# Patient Record
Sex: FEMALE | Race: BLACK OR AFRICAN AMERICAN | URBAN - METROPOLITAN AREA
[De-identification: names, ages, dates, MRNs, and addresses within clinical notes are randomized per-mention and may not be internally consistent; named-entity substitution may affect disease eponyms.]

---

## 2021-06-09 ENCOUNTER — LAB OUTSIDE AN ENCOUNTER (OUTPATIENT)
Dept: URBAN - METROPOLITAN AREA CLINIC 105 | Facility: CLINIC | Age: 66
End: 2021-06-09

## 2021-06-09 ENCOUNTER — OFFICE VISIT (OUTPATIENT)
Dept: URBAN - METROPOLITAN AREA CLINIC 105 | Facility: CLINIC | Age: 66
End: 2021-06-09
Payer: COMMERCIAL

## 2021-06-09 VITALS
TEMPERATURE: 97.3 F | BODY MASS INDEX: 32.74 KG/M2 | SYSTOLIC BLOOD PRESSURE: 140 MMHG | DIASTOLIC BLOOD PRESSURE: 84 MMHG | HEIGHT: 64 IN | WEIGHT: 191.8 LBS | HEART RATE: 68 BPM

## 2021-06-09 DIAGNOSIS — K59.00 CONSTIPATION, UNSPECIFIED CONSTIPATION TYPE: ICD-10-CM

## 2021-06-09 DIAGNOSIS — Z12.11 COLON CANCER SCREENING: ICD-10-CM

## 2021-06-09 PROCEDURE — 99204 OFFICE O/P NEW MOD 45 MIN: CPT | Performed by: INTERNAL MEDICINE

## 2021-06-09 RX ORDER — PANTOPRAZOLE SODIUM 20 MG/1
1 TABLET TABLET, DELAYED RELEASE ORAL ONCE A DAY
Status: ACTIVE | COMMUNITY

## 2021-06-09 RX ORDER — VALSARTAN 40 MG/1
1 TABLET TABLET, FILM COATED ORAL TWICE A DAY
Status: ACTIVE | COMMUNITY

## 2021-06-09 RX ORDER — AZELASTINE HYDROCHLORIDE 137 UG/1
1 PUFF IN EACH NOSTRIL SPRAY, METERED NASAL TWICE A DAY
Status: ACTIVE | COMMUNITY

## 2021-06-09 RX ORDER — ROSUVASTATIN CALCIUM 5 MG/1
1 TABLET TABLET, FILM COATED ORAL ONCE A DAY
Status: ACTIVE | COMMUNITY

## 2021-06-09 RX ORDER — LEVOCETIRIZINE DIHYDROCHLORIDE 5 MG/1
1 TABLET IN THE EVENING TABLET, FILM COATED ORAL ONCE A DAY
Status: ACTIVE | COMMUNITY

## 2021-06-09 RX ORDER — DICLOFENAC SODIUM TOPICAL GEL, 1% 10 MG/G
AS DIRECTED GEL TOPICAL
Status: ACTIVE | COMMUNITY

## 2021-06-09 RX ORDER — ERGOCALCIFEROL 1.25 MG/1
1 CAPSULE CAPSULE ORAL
Status: ACTIVE | COMMUNITY

## 2021-06-09 RX ORDER — CITALOPRAM HYDROBROMIDE 40 MG/1
0.5 TABLET TABLET, FILM COATED ORAL ONCE A DAY
Status: ACTIVE | COMMUNITY

## 2021-06-09 RX ORDER — B-COMPLEX WITH VITAMIN C
AS DIRECTED TABLET ORAL
Status: ACTIVE | COMMUNITY

## 2021-06-09 RX ORDER — RAMELTEON 8 MG/1
1 TABLET AT BEDTIME AS NEEDED TABLET ORAL ONCE A DAY
Status: ACTIVE | COMMUNITY

## 2021-06-09 NOTE — HPI-TODAY'S VISIT:
The patient presents for constipation.  Today, the patient says she has been on Linzess 72 mcg 1 pill QD for the past 1-2 years but she is dissatisfied with this treatment. She has d/c at times because it causes diarrhea. Off Linzess, she has 1 BM/week. She previously has taken Ex-lax, but not Miralax. She feels bloated. Milk upsets her stomach she states. Her last colon was almost 10 years ago which reportedly noted DD.

## 2021-06-13 PROBLEM — 14760008: Status: ACTIVE | Noted: 2021-06-09

## 2021-06-28 ENCOUNTER — OFFICE VISIT (OUTPATIENT)
Dept: URBAN - METROPOLITAN AREA SURGERY CENTER 16 | Facility: SURGERY CENTER | Age: 66
End: 2021-06-28
Payer: COMMERCIAL

## 2021-06-28 DIAGNOSIS — Z12.11 COLON CANCER SCREENING: ICD-10-CM

## 2021-06-28 PROCEDURE — G8907 PT DOC NO EVENTS ON DISCHARG: HCPCS | Performed by: INTERNAL MEDICINE

## 2021-06-28 PROCEDURE — G0121 COLON CA SCRN NOT HI RSK IND: HCPCS | Performed by: INTERNAL MEDICINE

## 2022-01-26 ENCOUNTER — OFFICE VISIT (OUTPATIENT)
Dept: URBAN - METROPOLITAN AREA CLINIC 105 | Facility: CLINIC | Age: 67
End: 2022-01-26

## 2022-02-16 ENCOUNTER — OFFICE VISIT (OUTPATIENT)
Dept: URBAN - METROPOLITAN AREA CLINIC 105 | Facility: CLINIC | Age: 67
End: 2022-02-16
Payer: COMMERCIAL

## 2022-02-16 VITALS
HEIGHT: 64 IN | SYSTOLIC BLOOD PRESSURE: 161 MMHG | HEART RATE: 61 BPM | WEIGHT: 181.4 LBS | DIASTOLIC BLOOD PRESSURE: 86 MMHG | BODY MASS INDEX: 30.97 KG/M2 | TEMPERATURE: 96.8 F

## 2022-02-16 DIAGNOSIS — R12 HEARTBURN: ICD-10-CM

## 2022-02-16 DIAGNOSIS — R63.0 LOSS OF APPETITE: ICD-10-CM

## 2022-02-16 PROBLEM — 79890006: Status: ACTIVE | Noted: 2022-02-16

## 2022-02-16 PROCEDURE — 99213 OFFICE O/P EST LOW 20 MIN: CPT | Performed by: INTERNAL MEDICINE

## 2022-02-16 RX ORDER — CITALOPRAM HYDROBROMIDE 40 MG/1
0.5 TABLET TABLET, FILM COATED ORAL ONCE A DAY
Status: ACTIVE | COMMUNITY

## 2022-02-16 RX ORDER — ROSUVASTATIN CALCIUM 5 MG/1
1 TABLET TABLET, FILM COATED ORAL ONCE A DAY
Status: ACTIVE | COMMUNITY

## 2022-02-16 RX ORDER — DICLOFENAC SODIUM TOPICAL GEL, 1% 10 MG/G
AS DIRECTED GEL TOPICAL
Status: ACTIVE | COMMUNITY

## 2022-02-16 RX ORDER — B-COMPLEX WITH VITAMIN C
AS DIRECTED TABLET ORAL
Status: ACTIVE | COMMUNITY

## 2022-02-16 RX ORDER — RAMELTEON 8 MG/1
1 TABLET AT BEDTIME AS NEEDED TABLET ORAL ONCE A DAY
Status: ACTIVE | COMMUNITY

## 2022-02-16 RX ORDER — ERGOCALCIFEROL 1.25 MG/1
1 CAPSULE CAPSULE ORAL
Status: ACTIVE | COMMUNITY

## 2022-02-16 RX ORDER — PANTOPRAZOLE SODIUM 20 MG/1
1 TABLET TABLET, DELAYED RELEASE ORAL ONCE A DAY
Status: ACTIVE | COMMUNITY

## 2022-02-16 RX ORDER — LEVOCETIRIZINE DIHYDROCHLORIDE 5 MG/1
1 TABLET IN THE EVENING TABLET, FILM COATED ORAL ONCE A DAY
Status: ACTIVE | COMMUNITY

## 2022-02-16 RX ORDER — VALSARTAN 40 MG/1
1 TABLET TABLET, FILM COATED ORAL TWICE A DAY
Status: ACTIVE | COMMUNITY

## 2022-02-16 RX ORDER — AZELASTINE HYDROCHLORIDE 137 UG/1
1 PUFF IN EACH NOSTRIL SPRAY, METERED NASAL TWICE A DAY
Status: ACTIVE | COMMUNITY

## 2022-02-16 NOTE — HPI-TODAY'S VISIT:
The patient presents for constipation.  Today, the patient says she has been on Linzess 72 mcg 1 pill QD for the past 1-2 years but she is dissatisfied with this treatment. She has d/c at times because it causes diarrhea. Off Linzess, she has 1 BM/week. She previously has taken Ex-lax, but not Miralax. She feels bloated. Milk upsets her stomach she states. Her last colon was almost 10 years ago which reportedly noted DD. -   02/16/2022:  In   June of 2021 Dr. Thompson performed her colonoscopy that was normal aside from sigmoid diverticulosis and internal hemorrhoids. She requests to transfer care to me. Pt says that she got a bad case of food poisining from [employer]. had vomiting and diarrhea. was seen in urgent care and got IV fluids. - she is having some issues with acid reflux and some loss of appetite.

## 2022-02-25 ENCOUNTER — OFFICE VISIT (OUTPATIENT)
Dept: URBAN - METROPOLITAN AREA CLINIC 91 | Facility: CLINIC | Age: 67
End: 2022-02-25

## 2022-03-15 ENCOUNTER — OFFICE VISIT (OUTPATIENT)
Dept: URBAN - METROPOLITAN AREA CLINIC 91 | Facility: CLINIC | Age: 67
End: 2022-03-15

## 2022-04-06 NOTE — PHYSICAL EXAM CONSTITUTIONAL:
well developed, well nourished , in no acute distress , ambulating without difficulty , normal communication ability Dorsal Nasal Flap Text: The defect edges were debeveled with a #15 scalpel blade.  Given the location of the defect and the proximity to free margins a dorsal nasal flap was deemed most appropriate.  Using a sterile surgical marker, an appropriate dorsal nasal flap was drawn around the defect.    The area thus outlined was incised deep to adipose tissue with a #15 scalpel blade.  The skin margins were undermined to an appropriate distance in all directions utilizing iris scissors.

## 2022-04-11 ENCOUNTER — CLAIMS CREATED FROM THE CLAIM WINDOW (OUTPATIENT)
Dept: URBAN - METROPOLITAN AREA CLINIC 4 | Facility: CLINIC | Age: 67
End: 2022-04-11
Payer: COMMERCIAL

## 2022-04-11 ENCOUNTER — OFFICE VISIT (OUTPATIENT)
Dept: URBAN - METROPOLITAN AREA SURGERY CENTER 16 | Facility: SURGERY CENTER | Age: 67
End: 2022-04-11
Payer: COMMERCIAL

## 2022-04-11 DIAGNOSIS — K22.8 OTHER SPECIFIED DISEASES OF ESOPHAGUS: ICD-10-CM

## 2022-04-11 DIAGNOSIS — K31.89 FOCAL FOVEOLAR HYPERPLASIA: ICD-10-CM

## 2022-04-11 DIAGNOSIS — R10.13 ABDOMINAL DISCOMFORT, EPIGASTRIC: ICD-10-CM

## 2022-04-11 PROCEDURE — G8907 PT DOC NO EVENTS ON DISCHARG: HCPCS | Performed by: INTERNAL MEDICINE

## 2022-04-11 PROCEDURE — 88305 TISSUE EXAM BY PATHOLOGIST: CPT | Performed by: PATHOLOGY

## 2022-04-11 PROCEDURE — 43239 EGD BIOPSY SINGLE/MULTIPLE: CPT | Performed by: INTERNAL MEDICINE

## 2022-05-18 ENCOUNTER — OFFICE VISIT (OUTPATIENT)
Dept: URBAN - METROPOLITAN AREA CLINIC 105 | Facility: CLINIC | Age: 67
End: 2022-05-18

## 2022-05-23 ENCOUNTER — OFFICE VISIT (OUTPATIENT)
Dept: URBAN - METROPOLITAN AREA CLINIC 105 | Facility: CLINIC | Age: 67
End: 2022-05-23
Payer: COMMERCIAL

## 2022-05-23 DIAGNOSIS — R14.3 FLATULENCE: ICD-10-CM

## 2022-05-23 DIAGNOSIS — R12 BURNING REFLUX: ICD-10-CM

## 2022-05-23 DIAGNOSIS — R14.0 BLOATING: ICD-10-CM

## 2022-05-23 DIAGNOSIS — K44.9 HIATAL HERNIA: ICD-10-CM

## 2022-05-23 PROBLEM — 16331000: Status: ACTIVE | Noted: 2022-03-22

## 2022-05-23 PROCEDURE — 99214 OFFICE O/P EST MOD 30 MIN: CPT | Performed by: INTERNAL MEDICINE

## 2022-05-23 RX ORDER — DICLOFENAC SODIUM TOPICAL GEL, 1% 10 MG/G
AS DIRECTED GEL TOPICAL
Status: ON HOLD | COMMUNITY

## 2022-05-23 RX ORDER — AZELASTINE HYDROCHLORIDE 137 UG/1
1 PUFF IN EACH NOSTRIL SPRAY, METERED NASAL TWICE A DAY
Status: ACTIVE | COMMUNITY

## 2022-05-23 RX ORDER — LEVOCETIRIZINE DIHYDROCHLORIDE 5 MG/1
1 TABLET IN THE EVENING TABLET, FILM COATED ORAL ONCE A DAY
Status: ACTIVE | COMMUNITY

## 2022-05-23 RX ORDER — VALSARTAN 40 MG/1
1 TABLET TABLET, FILM COATED ORAL TWICE A DAY
Status: ACTIVE | COMMUNITY

## 2022-05-23 RX ORDER — RAMELTEON 8 MG/1
1 TABLET AT BEDTIME AS NEEDED TABLET ORAL ONCE A DAY
Status: ON HOLD | COMMUNITY

## 2022-05-23 RX ORDER — PANTOPRAZOLE SODIUM 20 MG/1
1 TABLET TABLET, DELAYED RELEASE ORAL ONCE A DAY
Status: ON HOLD | COMMUNITY

## 2022-05-23 RX ORDER — ROSUVASTATIN CALCIUM 5 MG/1
1 TABLET TABLET, FILM COATED ORAL ONCE A DAY
Status: ACTIVE | COMMUNITY

## 2022-05-23 RX ORDER — B-COMPLEX WITH VITAMIN C
AS DIRECTED TABLET ORAL
Status: ACTIVE | COMMUNITY

## 2022-05-23 RX ORDER — ERGOCALCIFEROL 1.25 MG/1
1 CAPSULE CAPSULE ORAL
Status: ACTIVE | COMMUNITY

## 2022-05-23 RX ORDER — CITALOPRAM HYDROBROMIDE 40 MG/1
0.5 TABLET TABLET, FILM COATED ORAL ONCE A DAY
Status: ACTIVE | COMMUNITY

## 2022-05-23 RX ORDER — OMEPRAZOLE 20 MG/1
CAPSULE, DELAYED RELEASE ORAL
Qty: 90 | Refills: 0 | Status: ACTIVE | COMMUNITY

## 2022-05-23 NOTE — HPI-TODAY'S VISIT:
The patient presents for constipation.  Today, the patient says she has been on Linzess 72 mcg 1 pill QD for the past 1-2 years but she is dissatisfied with this treatment. She has d/c at times because it causes diarrhea. Off Linzess, she has 1 BM/week. She previously has taken Ex-lax, but not Miralax. She feels bloated. Milk upsets her stomach she states. Her last colon was almost 10 years ago which reportedly noted DD. -   02/16/2022:  In   June of 2021 Dr. Thompson performed her colonoscopy that was normal aside from sigmoid diverticulosis and internal hemorrhoids. She requests to transfer care to me. Pt says that she got a bad case of food poisining from [employer]. had vomiting and diarrhea. was seen in urgent care and got IV fluids. - she is having some issues with acid reflux and some loss of appetite. -  05/23/2022: In April of 2022 we did EGD secondary to epigastric discomfort loss of appetite along with heartburn and acid reflux.  There was a fairly in medium size hiatal hernia present.  Antral biopsies were negative for Helicobacter.  Biopsies from the duodenum was negative for celiac disease.  In March of 2022 the patient underwent right upper quadrant ultrasound only showing hepatic steatosis. - she says that her stomach is bloated and "I have gas a lot". she is passing flatulence very often. her acid reflux is controlled.

## 2022-06-15 ENCOUNTER — TELEPHONE ENCOUNTER (OUTPATIENT)
Dept: URBAN - METROPOLITAN AREA CLINIC 95 | Facility: CLINIC | Age: 67
End: 2022-06-15

## 2022-06-16 ENCOUNTER — CLAIMS CREATED FROM THE CLAIM WINDOW (OUTPATIENT)
Dept: URBAN - METROPOLITAN AREA CLINIC 105 | Facility: CLINIC | Age: 67
End: 2022-06-16
Payer: COMMERCIAL

## 2022-06-16 ENCOUNTER — WEB ENCOUNTER (OUTPATIENT)
Dept: URBAN - METROPOLITAN AREA CLINIC 105 | Facility: CLINIC | Age: 67
End: 2022-06-16

## 2022-06-16 VITALS
HEIGHT: 64 IN | HEART RATE: 99 BPM | SYSTOLIC BLOOD PRESSURE: 158 MMHG | TEMPERATURE: 97.5 F | DIASTOLIC BLOOD PRESSURE: 96 MMHG | WEIGHT: 191.2 LBS | BODY MASS INDEX: 32.64 KG/M2

## 2022-06-16 DIAGNOSIS — I10 HTN (HYPERTENSION), BENIGN: ICD-10-CM

## 2022-06-16 DIAGNOSIS — R14.0 BLOATING: ICD-10-CM

## 2022-06-16 DIAGNOSIS — K57.90 DIVERTICULOSIS: ICD-10-CM

## 2022-06-16 DIAGNOSIS — E78.2 MIXED HYPERLIPIDEMIA: ICD-10-CM

## 2022-06-16 DIAGNOSIS — K21.9 GERD WITHOUT ESOPHAGITIS: ICD-10-CM

## 2022-06-16 DIAGNOSIS — K44.9 HIATAL HERNIA: ICD-10-CM

## 2022-06-16 DIAGNOSIS — K59.09 CHRONIC CONSTIPATION: ICD-10-CM

## 2022-06-16 DIAGNOSIS — K57.50 DIVERTICULOSIS OF BOTH SMALL AND LARGE INTESTINE WITHOUT BLEEDING: ICD-10-CM

## 2022-06-16 DIAGNOSIS — R14.3 FLATULENCE: ICD-10-CM

## 2022-06-16 PROBLEM — 10725009: Status: ACTIVE | Noted: 2022-06-16

## 2022-06-16 PROCEDURE — 99214 OFFICE O/P EST MOD 30 MIN: CPT | Performed by: INTERNAL MEDICINE

## 2022-06-16 RX ORDER — DICLOFENAC SODIUM TOPICAL GEL, 1% 10 MG/G
AS DIRECTED GEL TOPICAL
Status: ON HOLD | COMMUNITY

## 2022-06-16 RX ORDER — RAMELTEON 8 MG/1
1 TABLET AT BEDTIME AS NEEDED TABLET ORAL ONCE A DAY
Status: ON HOLD | COMMUNITY

## 2022-06-16 RX ORDER — LEVOCETIRIZINE DIHYDROCHLORIDE 5 MG/1
1 TABLET IN THE EVENING TABLET, FILM COATED ORAL ONCE A DAY
Status: ACTIVE | COMMUNITY

## 2022-06-16 RX ORDER — AZELASTINE HYDROCHLORIDE 137 UG/1
1 PUFF IN EACH NOSTRIL SPRAY, METERED NASAL TWICE A DAY
Status: ACTIVE | COMMUNITY

## 2022-06-16 RX ORDER — ERGOCALCIFEROL 1.25 MG/1
1 CAPSULE CAPSULE ORAL
Status: ACTIVE | COMMUNITY

## 2022-06-16 RX ORDER — PANTOPRAZOLE SODIUM 20 MG/1
1 TABLET TABLET, DELAYED RELEASE ORAL ONCE A DAY
Status: ON HOLD | COMMUNITY

## 2022-06-16 RX ORDER — B-COMPLEX WITH VITAMIN C
AS DIRECTED TABLET ORAL
Status: ACTIVE | COMMUNITY

## 2022-06-16 RX ORDER — OMEPRAZOLE 20 MG/1
CAPSULE, DELAYED RELEASE ORAL
Qty: 90 | Refills: 0 | Status: ACTIVE | COMMUNITY

## 2022-06-16 RX ORDER — CITALOPRAM HYDROBROMIDE 40 MG/1
0.5 TABLET TABLET, FILM COATED ORAL ONCE A DAY
Status: ACTIVE | COMMUNITY

## 2022-06-16 RX ORDER — ROSUVASTATIN CALCIUM 5 MG/1
1 TABLET TABLET, FILM COATED ORAL ONCE A DAY
Status: ACTIVE | COMMUNITY

## 2022-06-16 RX ORDER — VALSARTAN 40 MG/1
1 TABLET TABLET, FILM COATED ORAL TWICE A DAY
Status: ACTIVE | COMMUNITY

## 2022-06-16 NOTE — PHYSICAL EXAM EYES:
Conjuntivae and eyelids appear normal,  Sclerae : White without injection Island Pedicle Flap-Requiring Vessel Identification Text: The defect edges were debeveled with a #15 scalpel blade.  Given the location of the defect, shape of the defect and the proximity to free margins an island pedicle advancement flap was deemed most appropriate.  Using a sterile surgical marker, an appropriate advancement flap was drawn, based on the axial vessel mentioned above, incorporating the defect, outlining the appropriate donor tissue and placing the expected incisions within the relaxed skin tension lines where possible.    The area thus outlined was incised deep to adipose tissue with a #15 scalpel blade.  The skin margins were undermined to an appropriate distance in all directions around the primary defect and laterally outward around the island pedicle utilizing iris scissors.  There was minimal undermining beneath the pedicle flap.

## 2022-06-16 NOTE — HPI-TODAY'S VISIT:
The patient presents for constipation.  Today, the patient says she has been on Linzess 72 mcg 1 pill QD for the past 1-2 years but she is dissatisfied with this treatment. She has d/c at times because it causes diarrhea. Off Linzess, she has 1 BM/week. She previously has taken Ex-lax, but not Miralax. She feels bloated. Milk upsets her stomach she states. Her last colon was almost 10 years ago which reportedly noted DD. -   02/16/2022:  In   June of 2021 Dr. Thompson performed her colonoscopy that was normal aside from sigmoid diverticulosis and internal hemorrhoids. She requests to transfer care to me. Pt says that she got a bad case of food poisining from [employer]. had vomiting and diarrhea. was seen in urgent care and got IV fluids. - she is having some issues with acid reflux and some loss of appetite. -  05/23/2022: In April of 2022 we did EGD secondary to epigastric discomfort loss of appetite along with heartburn and acid reflux.  There was a fairly in medium size hiatal hernia present.  Antral biopsies were negative for Helicobacter.  Biopsies from the duodenum was negative for celiac disease.  In March of 2022 the patient underwent right upper quadrant ultrasound only showing hepatic steatosis. - she says that her stomach is bloated and "I have gas a lot". she is passing flatulence very often. her acid reflux is controlled. -  06/16/2022: At her last visit the patient had very well-controlled acid reflux disease.  Her main complaint was flatulence and gas and bloating.  I gave her samples of Florastor to start to see if that would help that. -  she has been bad worried about her hiatal hernia.  Her acid reflux disease is very well controlled.  Bowels are still a little bit slow.  She had quit taking the Linzess because it was causing diarrhea.  She did start Florajen 3 probiotics.

## 2022-07-08 ENCOUNTER — TELEPHONE ENCOUNTER (OUTPATIENT)
Dept: URBAN - METROPOLITAN AREA CLINIC 95 | Facility: CLINIC | Age: 67
End: 2022-07-08

## 2022-07-08 PROBLEM — 63532004 DIVERTICULA OF INTESTINE: Status: ACTIVE | Noted: 2022-07-08

## 2022-07-20 ENCOUNTER — OFFICE VISIT (OUTPATIENT)
Dept: URBAN - METROPOLITAN AREA CLINIC 105 | Facility: CLINIC | Age: 67
End: 2022-07-20
Payer: COMMERCIAL

## 2022-07-20 VITALS
DIASTOLIC BLOOD PRESSURE: 79 MMHG | HEART RATE: 67 BPM | WEIGHT: 193 LBS | TEMPERATURE: 97.2 F | SYSTOLIC BLOOD PRESSURE: 154 MMHG | HEIGHT: 64 IN | BODY MASS INDEX: 32.95 KG/M2

## 2022-07-20 DIAGNOSIS — R10.30 LOWER ABDOMINAL PAIN: ICD-10-CM

## 2022-07-20 DIAGNOSIS — K57.90 DIVERTICULOSIS: ICD-10-CM

## 2022-07-20 DIAGNOSIS — K59.09 CHRONIC CONSTIPATION: ICD-10-CM

## 2022-07-20 DIAGNOSIS — R14.0 BLOATING: ICD-10-CM

## 2022-07-20 DIAGNOSIS — R14.3 FLATULENCE: ICD-10-CM

## 2022-07-20 PROBLEM — 397881000: Status: ACTIVE | Noted: 2022-06-16

## 2022-07-20 PROCEDURE — 99214 OFFICE O/P EST MOD 30 MIN: CPT | Performed by: INTERNAL MEDICINE

## 2022-07-20 RX ORDER — B-COMPLEX WITH VITAMIN C
AS DIRECTED TABLET ORAL
Status: ACTIVE | COMMUNITY

## 2022-07-20 RX ORDER — ROSUVASTATIN CALCIUM 5 MG/1
1 TABLET TABLET, FILM COATED ORAL ONCE A DAY
Status: ACTIVE | COMMUNITY

## 2022-07-20 RX ORDER — CITALOPRAM HYDROBROMIDE 40 MG/1
0.5 TABLET TABLET, FILM COATED ORAL ONCE A DAY
Status: ACTIVE | COMMUNITY

## 2022-07-20 RX ORDER — ERGOCALCIFEROL 1.25 MG/1
1 CAPSULE CAPSULE ORAL
Status: ACTIVE | COMMUNITY

## 2022-07-20 RX ORDER — RAMELTEON 8 MG/1
1 TABLET AT BEDTIME AS NEEDED TABLET ORAL ONCE A DAY
Status: ON HOLD | COMMUNITY

## 2022-07-20 RX ORDER — OMEPRAZOLE 20 MG/1
CAPSULE, DELAYED RELEASE ORAL
Qty: 90 | Refills: 0 | Status: ACTIVE | COMMUNITY

## 2022-07-20 RX ORDER — PANTOPRAZOLE SODIUM 20 MG/1
1 TABLET TABLET, DELAYED RELEASE ORAL ONCE A DAY
Status: ON HOLD | COMMUNITY

## 2022-07-20 RX ORDER — LEVOCETIRIZINE DIHYDROCHLORIDE 5 MG/1
1 TABLET IN THE EVENING TABLET, FILM COATED ORAL ONCE A DAY
Status: ACTIVE | COMMUNITY

## 2022-07-20 RX ORDER — VALSARTAN 40 MG/1
1 TABLET TABLET, FILM COATED ORAL TWICE A DAY
Status: ACTIVE | COMMUNITY

## 2022-07-20 RX ORDER — AZELASTINE HYDROCHLORIDE 137 UG/1
1 PUFF IN EACH NOSTRIL SPRAY, METERED NASAL TWICE A DAY
Status: ACTIVE | COMMUNITY

## 2022-07-20 RX ORDER — DICLOFENAC SODIUM TOPICAL GEL, 1% 10 MG/G
AS DIRECTED GEL TOPICAL
Status: ON HOLD | COMMUNITY

## 2022-07-20 NOTE — HPI-TODAY'S VISIT:
The patient presents for constipation.  Today, the patient says she has been on Linzess 72 mcg 1 pill QD for the past 1-2 years but she is dissatisfied with this treatment. She has d/c at times because it causes diarrhea. Off Linzess, she has 1 BM/week. She previously has taken Ex-lax, but not Miralax. She feels bloated. Milk upsets her stomach she states. Her last colon was almost 10 years ago which reportedly noted DD. -   2022:  In   2021 Dr. Thompson performed her colonoscopy that was normal aside from sigmoid diverticulosis and internal hemorrhoids. She requests to transfer care to me. Pt says that she got a bad case of food poisining from [employer]. had vomiting and diarrhea. was seen in urgent care and got IV fluids. - she is having some issues with acid reflux and some loss of appetite. -  2022: In 2022 we did EGD secondary to epigastric discomfort loss of appetite along with heartburn and acid reflux.  There was a fairly in medium size hiatal hernia present.  Antral biopsies were negative for Helicobacter.  Biopsies from the duodenum was negative for celiac disease.  In 2022 the patient underwent right upper quadrant ultrasound only showing hepatic steatosis. - she says that her stomach is bloated and "I have gas a lot". she is passing flatulence very often. her acid reflux is controlled. -  2022: At her last visit the patient had very well-controlled acid reflux disease.  Her main complaint was flatulence and gas and bloating.  I gave her samples of Florastor to start to see if that would help that. -  she has been bad worried about her hiatal hernia.  Her acid reflux disease is very well controlled.  Bowels are still a little bit slow.  She had quit taking the Linzess because it was causing diarrhea.  She did start Florajen 3 probiotics. -  2022: Patient comes with persistent abdominal pain.  Her sister just  of cancer and her neighbors got cancer to and she is bad worried about this.

## 2022-11-29 ENCOUNTER — OFFICE VISIT (OUTPATIENT)
Dept: URBAN - METROPOLITAN AREA CLINIC 105 | Facility: CLINIC | Age: 67
End: 2022-11-29

## 2022-12-15 ENCOUNTER — OFFICE VISIT (OUTPATIENT)
Dept: URBAN - METROPOLITAN AREA CLINIC 105 | Facility: CLINIC | Age: 67
End: 2022-12-15

## 2022-12-28 ENCOUNTER — OFFICE VISIT (OUTPATIENT)
Dept: URBAN - METROPOLITAN AREA CLINIC 105 | Facility: CLINIC | Age: 67
End: 2022-12-28
Payer: COMMERCIAL

## 2022-12-28 ENCOUNTER — DASHBOARD ENCOUNTERS (OUTPATIENT)
Age: 67
End: 2022-12-28

## 2022-12-28 VITALS
HEIGHT: 64 IN | BODY MASS INDEX: 34.28 KG/M2 | TEMPERATURE: 97 F | DIASTOLIC BLOOD PRESSURE: 87 MMHG | WEIGHT: 200.8 LBS | HEART RATE: 73 BPM | SYSTOLIC BLOOD PRESSURE: 130 MMHG

## 2022-12-28 DIAGNOSIS — K21.9 GERD WITHOUT ESOPHAGITIS: ICD-10-CM

## 2022-12-28 DIAGNOSIS — R10.9 ABDOMINAL CRAMPING: ICD-10-CM

## 2022-12-28 DIAGNOSIS — E78.2 MIXED HYPERLIPIDEMIA: ICD-10-CM

## 2022-12-28 DIAGNOSIS — K59.09 CHRONIC CONSTIPATION: ICD-10-CM

## 2022-12-28 DIAGNOSIS — R14.0 BLOATING: ICD-10-CM

## 2022-12-28 PROBLEM — 267434003: Status: ACTIVE | Noted: 2022-06-16

## 2022-12-28 PROBLEM — 266435005: Status: ACTIVE | Noted: 2022-06-16

## 2022-12-28 PROCEDURE — 99213 OFFICE O/P EST LOW 20 MIN: CPT | Performed by: INTERNAL MEDICINE

## 2022-12-28 RX ORDER — PANTOPRAZOLE SODIUM 20 MG/1
1 TABLET TABLET, DELAYED RELEASE ORAL ONCE A DAY
Status: ON HOLD | COMMUNITY

## 2022-12-28 RX ORDER — B-COMPLEX WITH VITAMIN C
AS DIRECTED TABLET ORAL
Status: ACTIVE | COMMUNITY

## 2022-12-28 RX ORDER — AZELASTINE HYDROCHLORIDE 137 UG/1
1 PUFF IN EACH NOSTRIL SPRAY, METERED NASAL TWICE A DAY
Status: ACTIVE | COMMUNITY

## 2022-12-28 RX ORDER — OMEPRAZOLE 20 MG/1
CAPSULE, DELAYED RELEASE ORAL
Qty: 90 | Refills: 0 | Status: ACTIVE | COMMUNITY

## 2022-12-28 RX ORDER — ROSUVASTATIN CALCIUM 5 MG/1
1 TABLET TABLET, FILM COATED ORAL ONCE A DAY
Status: ACTIVE | COMMUNITY

## 2022-12-28 RX ORDER — VALSARTAN 40 MG/1
1 TABLET TABLET, FILM COATED ORAL TWICE A DAY
Status: ACTIVE | COMMUNITY

## 2022-12-28 RX ORDER — CITALOPRAM HYDROBROMIDE 40 MG/1
0.5 TABLET TABLET, FILM COATED ORAL ONCE A DAY
Status: ACTIVE | COMMUNITY

## 2022-12-28 RX ORDER — ERGOCALCIFEROL 1.25 MG/1
1 CAPSULE CAPSULE ORAL
Status: ACTIVE | COMMUNITY

## 2022-12-28 RX ORDER — LEVOCETIRIZINE DIHYDROCHLORIDE 5 MG/1
1 TABLET IN THE EVENING TABLET, FILM COATED ORAL ONCE A DAY
Status: ACTIVE | COMMUNITY

## 2022-12-28 RX ORDER — RAMELTEON 8 MG/1
1 TABLET AT BEDTIME AS NEEDED TABLET ORAL ONCE A DAY
Status: ON HOLD | COMMUNITY

## 2022-12-28 RX ORDER — DICLOFENAC SODIUM TOPICAL GEL, 1% 10 MG/G
AS DIRECTED GEL TOPICAL
Status: ON HOLD | COMMUNITY

## 2022-12-28 NOTE — PHYSICAL EXAM MUSCULOSKELETAL:
called and left message for pt to call back and schedule a f/u appt. and labs. per Dr. Naranjo   normal gait and station , no tenderness or deformities present

## 2022-12-28 NOTE — HPI-TODAY'S VISIT:
The patient presents for constipation.  Today, the patient says she has been on Linzess 72 mcg 1 pill QD for the past 1-2 years but she is dissatisfied with this treatment. She has d/c at times because it causes diarrhea. Off Linzess, she has 1 BM/week. She previously has taken Ex-lax, but not Miralax. She feels bloated. Milk upsets her stomach she states. Her last colon was almost 10 years ago which reportedly noted DD. -   2022:  In   2021 Dr. Thompson performed her colonoscopy that was normal aside from sigmoid diverticulosis and internal hemorrhoids. She requests to transfer care to me. Pt says that she got a bad case of food poisining from [place]. had vomiting and diarrhea. was seen in urgent care and got IV fluids. - she is having some issues with acid reflux and some loss of appetite. -  2022: In 2022 we did EGD secondary to epigastric discomfort loss of appetite along with heartburn and acid reflux.  There was a fairly in medium size hiatal hernia present.  Antral biopsies were negative for Helicobacter.  Biopsies from the duodenum was negative for celiac disease.  In 2022 the patient underwent right upper quadrant ultrasound only showing hepatic steatosis. - she says that her stomach is bloated and "I have gas a lot". she is passing flatulence very often. her acid reflux is controlled. -  2022: At her last visit the patient had very well-controlled acid reflux disease.  Her main complaint was flatulence and gas and bloating.  I gave her samples of Florastor to start to see if that would help that. -  she has been bad worried about her hiatal hernia.  Her acid reflux disease is very well controlled.  Bowels are still a little bit slow.  She had quit taking the Linzess because it was causing diarrhea.  She did start Florajen 3 probiotics. -  2022: Patient comes with persistent abdominal pain.  Her sister just  of cancer and her neighbors got cancer to and she is bad worried about this. - 2022: c t scan done in 2022 shows diverticulosis and constipation. a few liver cysts. -

## 2023-01-04 ENCOUNTER — TELEPHONE ENCOUNTER (OUTPATIENT)
Dept: URBAN - METROPOLITAN AREA CLINIC 105 | Facility: CLINIC | Age: 68
End: 2023-01-04

## 2023-01-04 RX ORDER — LINACLOTIDE 72 UG/1
1 CAPSULE CAPSULE, GELATIN COATED ORAL ONCE A DAY
Qty: 90 CAPSULE | Refills: 3 | OUTPATIENT
Start: 2023-01-04 | End: 2023-12-30

## 2025-08-26 ENCOUNTER — CLAIMS CREATED FROM THE CLAIM WINDOW (OUTPATIENT)
Dept: URBAN - METROPOLITAN AREA MEDICAL CENTER 18 | Facility: MEDICAL CENTER | Age: 70
End: 2025-08-26
Payer: COMMERCIAL

## 2025-08-26 DIAGNOSIS — R93.2 ABNORMAL FINDINGS ON DIAGNOSTIC IMAGING OF LIVER AND BILIARY TRACT: ICD-10-CM

## 2025-08-26 PROCEDURE — 43264 ERCP REMOVE DUCT CALCULI: CPT | Performed by: INTERNAL MEDICINE

## 2025-08-26 PROCEDURE — 99253 IP/OBS CNSLTJ NEW/EST LOW 45: CPT | Performed by: INTERNAL MEDICINE

## 2025-08-27 ENCOUNTER — CLAIMS CREATED FROM THE CLAIM WINDOW (OUTPATIENT)
Dept: URBAN - METROPOLITAN AREA MEDICAL CENTER 18 | Facility: MEDICAL CENTER | Age: 70
End: 2025-08-27
Payer: COMMERCIAL

## 2025-08-27 DIAGNOSIS — R93.2 ABNORMAL FINDINGS ON DIAGNOSTIC IMAGING OF LIVER AND BILIARY TRACT: ICD-10-CM

## 2025-08-27 PROCEDURE — 99231 SBSQ HOSP IP/OBS SF/LOW 25: CPT | Performed by: INTERNAL MEDICINE

## 2025-08-28 ENCOUNTER — CLAIMS CREATED FROM THE CLAIM WINDOW (OUTPATIENT)
Dept: URBAN - METROPOLITAN AREA MEDICAL CENTER 18 | Facility: MEDICAL CENTER | Age: 70
End: 2025-08-28
Payer: COMMERCIAL

## 2025-08-28 DIAGNOSIS — R93.2 ABNORMAL FINDINGS ON DIAGNOSTIC IMAGING OF LIVER AND BILIARY TRACT: ICD-10-CM

## 2025-08-28 PROCEDURE — 99231 SBSQ HOSP IP/OBS SF/LOW 25: CPT | Performed by: INTERNAL MEDICINE

## 2025-08-29 ENCOUNTER — CLAIMS CREATED FROM THE CLAIM WINDOW (OUTPATIENT)
Dept: URBAN - METROPOLITAN AREA MEDICAL CENTER 18 | Facility: MEDICAL CENTER | Age: 70
End: 2025-08-29
Payer: COMMERCIAL

## 2025-08-29 DIAGNOSIS — R93.2 ABNORMAL FINDINGS ON DIAGNOSTIC IMAGING OF LIVER AND BILIARY TRACT: ICD-10-CM

## 2025-08-29 PROCEDURE — 99231 SBSQ HOSP IP/OBS SF/LOW 25: CPT | Performed by: INTERNAL MEDICINE
